# Patient Record
Sex: FEMALE | Race: OTHER | HISPANIC OR LATINO | URBAN - METROPOLITAN AREA
[De-identification: names, ages, dates, MRNs, and addresses within clinical notes are randomized per-mention and may not be internally consistent; named-entity substitution may affect disease eponyms.]

---
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
## 2022-04-11 PROBLEM — Z11.59 SCREENING FOR VIRAL DISEASE: Status: ACTIVE | Noted: 2021-01-22

## 2022-10-03 ENCOUNTER — APPOINTMENT (OUTPATIENT)
Dept: FAMILY MEDICINE | Facility: CLINIC | Age: 22
End: 2022-10-03

## 2022-10-03 VITALS
OXYGEN SATURATION: 98 % | WEIGHT: 146 LBS | HEIGHT: 61 IN | TEMPERATURE: 97.5 F | DIASTOLIC BLOOD PRESSURE: 64 MMHG | SYSTOLIC BLOOD PRESSURE: 108 MMHG | HEART RATE: 83 BPM | BODY MASS INDEX: 27.56 KG/M2

## 2022-10-03 DIAGNOSIS — Z13.29 ENCOUNTER FOR SCREENING FOR OTHER SUSPECTED ENDOCRINE DISORDER: ICD-10-CM

## 2022-10-03 DIAGNOSIS — R70.0 ELEVATED ERYTHROCYTE SEDIMENTATION RATE: ICD-10-CM

## 2022-10-03 DIAGNOSIS — R21 RASH AND OTHER NONSPECIFIC SKIN ERUPTION: ICD-10-CM

## 2022-10-03 DIAGNOSIS — Z87.2 PERSONAL HISTORY OF DISEASES OF THE SKIN AND SUBCUTANEOUS TISSUE: ICD-10-CM

## 2022-10-03 DIAGNOSIS — L03.115 CELLULITIS OF RIGHT LOWER LIMB: ICD-10-CM

## 2022-10-03 DIAGNOSIS — Z13.220 ENCOUNTER FOR SCREENING FOR LIPOID DISORDERS: ICD-10-CM

## 2022-10-03 DIAGNOSIS — Z13.1 ENCOUNTER FOR SCREENING FOR DIABETES MELLITUS: ICD-10-CM

## 2022-10-03 DIAGNOSIS — R79.82 ELEVATED C-REACTIVE PROTEIN (CRP): ICD-10-CM

## 2022-10-03 DIAGNOSIS — D50.8 OTHER IRON DEFICIENCY ANEMIAS: ICD-10-CM

## 2022-10-03 DIAGNOSIS — Z00.00 ENCOUNTER FOR GENERAL ADULT MEDICAL EXAMINATION W/OUT ABNORMAL FINDINGS: ICD-10-CM

## 2022-10-03 DIAGNOSIS — R76.8 OTHER SPECIFIED ABNORMAL IMMUNOLOGICAL FINDINGS IN SERUM: ICD-10-CM

## 2022-10-03 DIAGNOSIS — Z86.2 PERSONAL HISTORY OF DISEASES OF THE BLOOD AND BLOOD-FORMING ORGANS AND CERTAIN DISORDERS INVOLVING THE IMMUNE MECHANISM: ICD-10-CM

## 2022-10-03 DIAGNOSIS — H54.7 UNSPECIFIED VISUAL LOSS: ICD-10-CM

## 2022-10-03 DIAGNOSIS — Z86.39 PERSONAL HISTORY OF OTHER ENDOCRINE, NUTRITIONAL AND METABOLIC DISEASE: ICD-10-CM

## 2022-10-03 PROCEDURE — 99395 PREV VISIT EST AGE 18-39: CPT | Mod: 25

## 2022-10-03 PROCEDURE — G0444 DEPRESSION SCREEN ANNUAL: CPT | Mod: 59

## 2022-10-03 PROCEDURE — 36415 COLL VENOUS BLD VENIPUNCTURE: CPT

## 2022-10-03 RX ORDER — CEFADROXIL 500 MG/1
500 CAPSULE ORAL TWICE DAILY
Qty: 14 | Refills: 0 | Status: DISCONTINUED | COMMUNITY
Start: 2021-10-28 | End: 2022-10-03

## 2022-10-03 RX ORDER — ACETAMINOPHEN 500 MG/1
500 TABLET ORAL
Qty: 180 | Refills: 0 | Status: DISCONTINUED | COMMUNITY
Start: 2021-10-22 | End: 2022-10-03

## 2022-10-03 RX ORDER — ERGOCALCIFEROL 1.25 MG/1
1.25 MG CAPSULE, LIQUID FILLED ORAL
Qty: 12 | Refills: 0 | Status: DISCONTINUED | COMMUNITY
Start: 2021-02-01 | End: 2022-10-03

## 2022-10-03 RX ORDER — NAPROXEN 375 MG/1
375 TABLET ORAL
Qty: 20 | Refills: 0 | Status: DISCONTINUED | COMMUNITY
Start: 2021-10-28 | End: 2022-10-03

## 2022-10-03 NOTE — HISTORY OF PRESENT ILLNESS
[FreeTextEntry1] : CPE. [de-identified] : 23 yo female presents for annual physical. No acute complaints. Reports feeling well. Denies fever, chills, cp, palpitations, sob, nv, heat/cold intolerance, dizziness, melena, hematochezia, muscle weakness, loss of sensation, bowel/bladder incontinence or calf pain.\par

## 2022-10-03 NOTE — ASSESSMENT
[FreeTextEntry1] : Annual physical: f/u routine labwork \par Positive EMMA: no joint concerns, will ct monitor, f/u rheum\par Elevated ESR/CRP: likely reactive, f/u repeat\par Vitamin D def: f/u level\par BMI 27: Recommend low fat diet, wt loss, exercise, nutritional counseling provided, f/u lipid panel\par RTC 3 wks

## 2022-10-03 NOTE — HEALTH RISK ASSESSMENT
[Very Good] : ~his/her~  mood as very good [Never] : Never [Yes] : Yes [2 - 4 times a month (2 pts)] : 2-4 times a month (2 points) [1 or 2 (0 pts)] : 1 or 2 (0 points) [Never (0 pts)] : Never (0 points) [No] : In the past 12 months have you used drugs other than those required for medical reasons? No [No falls in past year] : Patient reported no falls in the past year [0] : 1) Little interest or pleasure doing things: Not at all (0) [1] : 2) Feeling down, depressed, or hopeless for several days (1) [PHQ-2 Negative - No further assessment needed] : PHQ-2 Negative - No further assessment needed [Audit-CScore] : 2 [de-identified] : regularly [de-identified] : healthy [[id number]] : 1 [HIV test declined] : HIV test declined [Hepatitis C test declined] : Hepatitis C test declined [With Family] : lives with family [Employed] : employed [Student] : student [Single] : single [Sexually Active] : not sexually active [High Risk Behavior] : no high risk behavior [Feels Safe at Home] : Feels safe at home [Fully functional (bathing, dressing, toileting, transferring, walking, feeding)] : Fully functional (bathing, dressing, toileting, transferring, walking, feeding) [Fully functional (using the telephone, shopping, preparing meals, housekeeping, doing laundry, using] : Fully functional and needs no help or supervision to perform IADLs (using the telephone, shopping, preparing meals, housekeeping, doing laundry, using transportation, managing medications and managing finances) [Reports changes in hearing] : Reports no changes in hearing [Reports changes in vision] : Reports no changes in vision [Reports changes in dental health] : Reports no changes in dental health

## 2022-10-11 LAB
25(OH)D3 SERPL-MCNC: 16.6 NG/ML
ALBUMIN SERPL ELPH-MCNC: 4.7 G/DL
ALP BLD-CCNC: 87 U/L
ALT SERPL-CCNC: 10 U/L
ANION GAP SERPL CALC-SCNC: 12 MMOL/L
APPEARANCE: CLEAR
AST SERPL-CCNC: 19 U/L
BACTERIA: NEGATIVE
BASOPHILS # BLD AUTO: 0.05 K/UL
BASOPHILS NFR BLD AUTO: 0.6 %
BILIRUB SERPL-MCNC: 0.6 MG/DL
BILIRUBIN URINE: NEGATIVE
BLOOD URINE: NEGATIVE
BUN SERPL-MCNC: 11 MG/DL
CALCIUM SERPL-MCNC: 9.6 MG/DL
CHLORIDE SERPL-SCNC: 103 MMOL/L
CHOLEST SERPL-MCNC: 143 MG/DL
CO2 SERPL-SCNC: 23 MMOL/L
COLOR: YELLOW
CREAT SERPL-MCNC: 0.54 MG/DL
CRP SERPL-MCNC: <3 MG/L
EGFR: 135 ML/MIN/1.73M2
EOSINOPHIL # BLD AUTO: 0.11 K/UL
EOSINOPHIL NFR BLD AUTO: 1.4 %
ERYTHROCYTE [SEDIMENTATION RATE] IN BLOOD BY WESTERGREN METHOD: 65 MM/HR
ESTIMATED AVERAGE GLUCOSE: 108 MG/DL
GLUCOSE QUALITATIVE U: NEGATIVE
GLUCOSE SERPL-MCNC: 78 MG/DL
HBA1C MFR BLD HPLC: 5.4 %
HCT VFR BLD CALC: 37.3 %
HDLC SERPL-MCNC: 58 MG/DL
HGB BLD-MCNC: 11.8 G/DL
HYALINE CASTS: 0 /LPF
IMM GRANULOCYTES NFR BLD AUTO: 0.3 %
KETONES URINE: NEGATIVE
LDLC SERPL CALC-MCNC: 75 MG/DL
LEUKOCYTE ESTERASE URINE: NEGATIVE
LYMPHOCYTES # BLD AUTO: 1.81 K/UL
LYMPHOCYTES NFR BLD AUTO: 23.1 %
MAN DIFF?: NORMAL
MCHC RBC-ENTMCNC: 26 PG
MCHC RBC-ENTMCNC: 31.6 GM/DL
MCV RBC AUTO: 82.2 FL
MICROSCOPIC-UA: NORMAL
MONOCYTES # BLD AUTO: 0.78 K/UL
MONOCYTES NFR BLD AUTO: 9.9 %
NEUTROPHILS # BLD AUTO: 5.08 K/UL
NEUTROPHILS NFR BLD AUTO: 64.7 %
NITRITE URINE: NEGATIVE
NONHDLC SERPL-MCNC: 84 MG/DL
PH URINE: 5.5
PLATELET # BLD AUTO: 231 K/UL
POTASSIUM SERPL-SCNC: 4.5 MMOL/L
PROT SERPL-MCNC: 7.5 G/DL
PROTEIN URINE: NORMAL
RBC # BLD: 4.54 M/UL
RBC # FLD: 15.2 %
RED BLOOD CELLS URINE: 4 /HPF
SODIUM SERPL-SCNC: 138 MMOL/L
SPECIFIC GRAVITY URINE: 1.03
SQUAMOUS EPITHELIAL CELLS: 1 /HPF
TRIGL SERPL-MCNC: 47 MG/DL
TSH SERPL-ACNC: 1.29 UIU/ML
UROBILINOGEN URINE: NORMAL
WBC # FLD AUTO: 7.85 K/UL
WHITE BLOOD CELLS URINE: 1 /HPF

## 2023-05-18 ENCOUNTER — APPOINTMENT (OUTPATIENT)
Dept: FAMILY MEDICINE | Facility: CLINIC | Age: 23
End: 2023-05-18
Payer: MEDICAID

## 2023-05-18 VITALS
HEIGHT: 61 IN | DIASTOLIC BLOOD PRESSURE: 70 MMHG | OXYGEN SATURATION: 100 % | HEART RATE: 79 BPM | SYSTOLIC BLOOD PRESSURE: 110 MMHG | BODY MASS INDEX: 32.1 KG/M2 | TEMPERATURE: 97.1 F | WEIGHT: 170 LBS

## 2023-05-18 DIAGNOSIS — E55.9 VITAMIN D DEFICIENCY, UNSPECIFIED: ICD-10-CM

## 2023-05-18 DIAGNOSIS — Z91.09 OTHER ALLERGY STATUS, OTHER THAN TO DRUGS AND BIOLOGICAL SUBSTANCES: ICD-10-CM

## 2023-05-18 DIAGNOSIS — F90.9 ATTENTION-DEFICIT HYPERACTIVITY DISORDER, UNSPECIFIED TYPE: ICD-10-CM

## 2023-05-18 PROCEDURE — 99214 OFFICE O/P EST MOD 30 MIN: CPT | Mod: 25

## 2023-05-18 RX ORDER — ERGOCALCIFEROL 1.25 MG/1
1.25 MG CAPSULE, LIQUID FILLED ORAL
Qty: 4 | Refills: 2 | Status: DISCONTINUED | COMMUNITY
Start: 2022-10-11 | End: 2023-05-18

## 2023-05-18 NOTE — HISTORY OF PRESENT ILLNESS
[FreeTextEntry1] : Pt. is here to discuss ADHD.  [de-identified] : 24 yo female presents with concern for difficulty concentrating, she says she has symptoms since childhood, she is interested in medication for adhd. Denies fever, chills, cp, palpitations, sob, nvcd.\par

## 2023-05-18 NOTE — ASSESSMENT
[FreeTextEntry1] : ADHD: Recommend maintaining daily schedule, limiting distractions, use charts/checklists, start vyvanse 30 mg po daily prn\par Allergies: well controlled\par Vitamin D def: f/u level\par RTC 2 wks

## 2023-05-25 LAB — 25(OH)D3 SERPL-MCNC: 16.5 NG/ML

## 2023-07-15 ENCOUNTER — APPOINTMENT (OUTPATIENT)
Dept: OBGYN | Facility: CLINIC | Age: 23
End: 2023-07-15
Payer: MEDICAID

## 2023-07-15 ENCOUNTER — NON-APPOINTMENT (OUTPATIENT)
Age: 23
End: 2023-07-15

## 2023-07-15 VITALS
WEIGHT: 172 LBS | DIASTOLIC BLOOD PRESSURE: 70 MMHG | HEIGHT: 61 IN | BODY MASS INDEX: 32.47 KG/M2 | SYSTOLIC BLOOD PRESSURE: 118 MMHG

## 2023-07-15 DIAGNOSIS — N89.8 OTHER SPECIFIED NONINFLAMMATORY DISORDERS OF VAGINA: ICD-10-CM

## 2023-07-15 PROCEDURE — 99203 OFFICE O/P NEW LOW 30 MIN: CPT

## 2023-07-15 NOTE — PLAN
[FreeTextEntry1] : vaginal discharge\par - normal exam today- d/c appeared normal\par - discussed pH balance questions pt had\par - gc/ct and affirm collected- will treat based on results\par \par follow up in 1-2 mos for annual exam

## 2023-07-15 NOTE — PHYSICAL EXAM
[Chaperone Present] : A chaperone was present in the examining room during all aspects of the physical examination [FreeTextEntry1] : SHANNON Gomes [Appropriately responsive] : appropriately responsive [Alert] : alert [No Acute Distress] : no acute distress [Oriented x3] : oriented x3 [Labia Majora] : normal [Labia Minora] : normal [Discharge] : a  ~M vaginal discharge was present [Scant] : scant [White] : white [No Bleeding] : There was no active vaginal bleeding [Normal] : normal [Uterine Adnexae] : normal

## 2023-07-15 NOTE — HISTORY OF PRESENT ILLNESS
[Y] : Patient is sexually active [N] : Patient denies prior pregnancies [Menarche Age: ____] : age at menarche was [unfilled] [No] : Patient does not have concerns regarding sex [Currently Active] : currently active [Women] : women [TextBox_4] : Lucy is here for c/o vaginal discharge and odor for the past few weeks.\par \par she denies pelvic pain, changes in her menses. \par \par She is sexually active with one female partner for over 1 year.   [LMPDate] : 09/19/23 [PGHxTotal] : 0 [FreeTextEntry1] : 06/19/23 [FreeTextEntry2] : has been sexually active with men in the past but not recently.

## 2023-07-17 LAB
C TRACH RRNA SPEC QL NAA+PROBE: NOT DETECTED
CANDIDA VAG CYTO: NOT DETECTED
G VAGINALIS+PREV SP MTYP VAG QL MICRO: DETECTED
N GONORRHOEA RRNA SPEC QL NAA+PROBE: NOT DETECTED
SOURCE AMPLIFICATION: NORMAL
T VAGINALIS VAG QL WET PREP: NOT DETECTED

## 2023-07-19 DIAGNOSIS — N76.0 ACUTE VAGINITIS: ICD-10-CM

## 2023-07-19 DIAGNOSIS — B96.89 ACUTE VAGINITIS: ICD-10-CM

## 2023-07-25 ENCOUNTER — APPOINTMENT (OUTPATIENT)
Dept: FAMILY MEDICINE | Facility: CLINIC | Age: 23
End: 2023-07-25

## 2024-02-20 ENCOUNTER — EMERGENCY (EMERGENCY)
Facility: HOSPITAL | Age: 24
LOS: 1 days | Discharge: HOME | End: 2024-02-20
Attending: EMERGENCY MEDICINE
Payer: COMMERCIAL

## 2024-02-20 VITALS
DIASTOLIC BLOOD PRESSURE: 76 MMHG | SYSTOLIC BLOOD PRESSURE: 119 MMHG | WEIGHT: 190.92 LBS | HEART RATE: 87 BPM | OXYGEN SATURATION: 99 % | HEIGHT: 62 IN | RESPIRATION RATE: 18 BRPM | TEMPERATURE: 98 F

## 2024-02-20 PROCEDURE — 99283 EMERGENCY DEPT VISIT LOW MDM: CPT

## 2024-02-20 PROCEDURE — 99282 EMERGENCY DEPT VISIT SF MDM: CPT

## 2024-02-20 NOTE — ED PROVIDER NOTE - CLINICAL SUMMARY MEDICAL DECISION MAKING FREE TEXT BOX
Patient with no significant past medical history presents emergency department for evaluation of head injury.  Patient states that she slipped down the stairs last night striking her head on a concrete step.  Patient states that she remembers before during and after the fall states that she has a mild mild nonradiating pain in the back of her head where she landed patient denies LOC nausea vomiting weakness numbness change in vision change in hearing.  On exam patient with no acute findings no hematoma head normocephalic no step-offs neurovascularly intact.  Patient greater than 12 hours status post fall neurovasc intact no acute findings on physical exam, patient to [place] with supportive care concussion precautions follow-up to PCP.  Patient educated about when to return to the ED if needed. PT verbalizes that he understands all instructions and results. Pt infomred that ED is open and availible 24/7 365 days a yr, encouraged to return to the ED if they have any change in condition, or feel the need for revaluation.

## 2024-02-20 NOTE — ED PROVIDER NOTE - NSFOLLOWUPINSTRUCTIONS_ED_ALL_ED_FT
Please Uses Over the Counter:   1) Tylenol: 500-650mg every 6hours as needed   2) Ibuprofen/Motrin/Advil: 400-600mg every 6hours as needed  FOR PAIN.       Patient education: Concussion in adults (The Basics)  Written by the doctors and editors at [facility]  Please read the Disclaimer at the end of this page.    What is a concussion?  A "concussion" is the medical term for a mild brain injury. It can cause confusion, memory loss, and headache.    A concussion usually happens after a person hits their head. But in some cases, it can happen after an injury or accident that causes violent shaking of the head.    Common causes of concussion include:    ?Car accidents    ?Falling down and other accidents that can happen from daily activities    ?Injuries from playing sports such as football, ice hockey, soccer, and boxing    ?Injuries that can happen to soldiers during combat. These include injuries from blasts and bullet wounds.    What are the symptoms of a concussion?  People used to think that "passing out" or "blacking out" was an important feature of a concussion. But it is actually common to have a concussion without blacking out.    Symptoms that can happen immediately, or in the first minutes to hours after a concussion, include:    ?Memory loss – People sometimes forget what caused their injury, as well as what happened right before and after the injury.    ?Confusion    ?Headache    ?Dizziness or trouble with balance    ?Nausea or vomiting    Some people recover quickly from a concussion and have no further symptoms. But others have symptoms that persist or happen hours to days after a concussion. These might include:    ?Trouble walking or talking    ?Memory problems or problems paying attention    ?Trouble sleeping or feeling very tired or sleepy    ?Mood or behavior changes (for example, acting cranky, irritable, or not like themselves)    ?Being bothered by things like noise or light    Will I need tests?  It depends on your injury and symptoms. To check if you have a concussion, your doctor will ask about your symptoms and do a physical exam. They will also ask you questions to check that you are thinking clearly.    If your doctor suspects a serious injury, they might order an imaging test of the brain, such as a CT or MRI scan. These tests create pictures of the skull and inside of the brain.    How is a concussion treated?  If you think that you are having symptoms related to a head injury or concussion, see a doctor or nurse. This might be your regular doctor, or they might refer you to a different doctor.    Treatment of a concussion involves:    ?Preventing further injury – While you are healing, it's important not to do too much, especially activities that could lead to another head injury, like organized sports. Having a second injury while the brain is healing from a concussion can seriously damage the brain.    ?Physical rest – Rest your body, and get plenty of sleep. Avoid heavy exercise or too much physical activity if it makes you feel worse.    ?Mental rest – Doctors also call this "cognitive rest." Avoid doing activities that need concentration or a lot of attention if they make you feel worse. Sometimes, activities using screens, especially videogames, can make people feel worse after a concussion. You can start doing these things again as you get better.    ?Avoiding alcohol and cannabis while you are still having symptoms of concussion    ?Treating headache – You can take an over-the-counter pain reliever if you have a headache. These include acetaminophen (sample brand name: Tylenol) and NSAIDs such as ibuprofen (sample brand names: Advil, Motrin) and naproxen (sample brand name: Aleve).    Most concussions get better on their own, but it can take time. For some people, the symptoms go away within minutes to hours. Other people have symptoms for weeks to months. When symptoms last a long time, doctors call it "postconcussion syndrome."    When should I call for help?  After a concussion, your doctor might recommend that someone stay with you for 12 to 24 hours. This person should watch for any new symptoms.    Someone should call for an ambulance (in the [place] and [place], call 9-1-1) if you:    ?Cannot be fully woken up    ?Are acting confused or disoriented    ?Have a sudden and persistent change in your behavior    ?Cannot walk normally    ?Have trouble speaking or slurred speech    ?Have severe weakness or cannot move an arm, leg, or 1 side of your face    ?Have a seizure or jerking of your arms or legs you cannot control    Someone should call the doctor or nurse for advice if you:    ?Have concussion symptoms that are not improving or are getting worse, even with physical and mental rest    ?Have blood or clear liquid draining from your ears or nose    ?Seem weak or have numbness in an arm, leg, or other body part    ?Have a stiff neck    ?Have a headache that is severe, gets worse, feels different, or does not get better with over-the-counter medicines    If any of the above symptoms seem severe, or if you are concerned but cannot reach the doctor or nurse, seek emergency help. These things don't always mean that there is a serious problem, but seeing a doctor or nurse is the only way to know for sure.    When can I play sports or do my usual activities again?  Ask your doctor when you can play sports or do your usual activities again. It will depend on your injury and symptoms, as well as the type of sport you play. Do not play sports on the same day as your injury.    When you are able to return to work also depends on your symptoms and the type of work you do.    It's important to let your brain heal completely after a concussion. Getting another concussion before your brain has healed can lead to serious brain problems.    How can I prevent another concussion?  To help prevent another concussion, you can:    ?Wear a helmet when you ride a bike or motorcycle, or play certain sports.    ?Wear a seat belt when you drive or ride in a car.    If you have had a concussion, it's very important to try to prevent future concussions. Having many concussions might cause long-term brain damage and affect your thinking.

## 2024-02-20 NOTE — ED PROVIDER NOTE - OBJECTIVE STATEMENT
Patient with no significant past medical history presents emergency department for evaluation of head injury.  Patient states that she slipped down the stairs last night striking her head on a concrete step.  Patient states that she remembers before during and after the fall states that she has a mild mild nonradiating pain in the back of her head where she landed patient denies LOC nausea vomiting weakness numbness change in vision change in hearing.

## 2024-02-20 NOTE — ED ADULT TRIAGE NOTE - TEMPERATURE IN FAHRENHEIT (DEGREES F)
Patient informed of the negative COVID-19 test result and instructed to social distance, wear a mask, and wash hands in order to prevent getting sick with COVID-19.     
Regarding: [place] Would like message from office  ----- Message from Elyce Reyes, HUC sent at 10/7/2021  6:29 PM CDT -----  Patient Name: Chrystal Liu    Full Name of Provider seen for current symptoms: Dr. Maria Del Carmen Mart     Pregnant (If Yes, how long?): N/A    Symptoms: Would like message from office    Do you or any of your household members have the following symptoms:  Fever >100.0#F or >38.0#C: N/a    New or worsening cough, shortness of breath, sore throat, congestion, or runny nose: N/a    New onset of nausea, vomiting or diarrhea: N/a    New onset of loss of taste or smell, chills, repeated shaking with chills, muscle pain, or headache: N/a    Have you, a household member, or another person you have been in contact with tested positive for COVID-19 in the last 14 days?: N/a    Call Back #: [phone]    Call Center Account # for provider seen for current symptoms: [id number]    Which State are you currently located in? (enter State name in Summary field): [place]   
97.8

## 2024-02-20 NOTE — ED PROVIDER NOTE - ENMT, MLM
Airway patent, Nasal mucosa clear. Mouth with normal mucosa. Throat has no vesicles, no oropharyngeal exudates and uvula is midline.  no acute findings no hematoma head normocephalic no step-offs neurovascularly intact.

## 2024-02-20 NOTE — ED PROVIDER NOTE - ADDITIONAL NOTES AND INSTRUCTIONS:
PT was evaluated At [facility] ED and was found to have a condition that warranted time of to rest and heal from WORK/SCHOOL.   Roderick Dean PA-C

## 2024-02-20 NOTE — ED PROVIDER NOTE - PATIENT PORTAL LINK FT
You can access the FollowMyHealth Patient Portal offered by [facility] by registering at the following website: [email or web address]. By joining [employer]’s FollowMyHealth portal, you will also be able to view your health information using other applications (apps) compatible with our system.

## 2024-03-23 ENCOUNTER — APPOINTMENT (OUTPATIENT)
Dept: OBGYN | Facility: CLINIC | Age: 24
End: 2024-03-23
Payer: COMMERCIAL

## 2024-03-23 VITALS
DIASTOLIC BLOOD PRESSURE: 68 MMHG | WEIGHT: 190.13 LBS | HEIGHT: 61 IN | SYSTOLIC BLOOD PRESSURE: 104 MMHG | BODY MASS INDEX: 35.9 KG/M2

## 2024-03-23 DIAGNOSIS — R30.0 DYSURIA: ICD-10-CM

## 2024-03-23 LAB
APPEARANCE: CLEAR
BILIRUBIN URINE: NEGATIVE
BLOOD URINE: ABNORMAL
COLOR: YELLOW
GLUCOSE QUALITATIVE U: NEGATIVE
HCG UR QL: NEGATIVE
KETONES URINE: 160
LEUKOCYTE ESTERASE URINE: NEGATIVE
NITRITE URINE: NEGATIVE
PH URINE: 7
PROTEIN URINE: NEGATIVE
QUALITY CONTROL: YES
SPECIFIC GRAVITY URINE: 1.02
UROBILINOGEN URINE: 0.2 (ref 0.2–?)

## 2024-03-23 PROCEDURE — 99213 OFFICE O/P EST LOW 20 MIN: CPT | Mod: 25

## 2024-03-23 PROCEDURE — 81025 URINE PREGNANCY TEST: CPT

## 2024-03-23 NOTE — HISTORY OF PRESENT ILLNESS
[Menarche Age: ____] : age at menarche was [unfilled] [N] : Patient reports normal menses [Y] : Patient is sexually active [Currently Active] : currently active [No] : Patient does not have concerns regarding sex [Women] : women [GonorrheaDate] : 07/15/2023 [TextBox_63] : NEG [ChlamydiaDate] : 07/15/2023 [TextBox_68] : NEG [LMPDate] : 03/02/2024 [PGHxTotal] : 0 [FreeTextEntry1] : 03/02/2024

## 2024-03-23 NOTE — REASON FOR VISIT
[Follow-Up] : a follow-up evaluation of [FreeTextEntry2] : ODOR, STING/ BURNING SENSATION -2 TO 3 WKS AGO

## 2024-03-23 NOTE — PLAN
[FreeTextEntry1] : Pt declined physical exam.   Urinalysis showed ketones/ trace urine. Negative for bacteria.  Will send for culture.  Gonorrhea/ chlamydia from urine.  Will follow up with patient on results.   All questions and concerns addressed.

## 2024-03-27 LAB
BACTERIA UR CULT: NORMAL
C TRACH RRNA SPEC QL NAA+PROBE: NOT DETECTED
N GONORRHOEA RRNA SPEC QL NAA+PROBE: NOT DETECTED
SOURCE AMPLIFICATION: NORMAL

## 2024-06-01 ENCOUNTER — APPOINTMENT (OUTPATIENT)
Dept: OBGYN | Facility: CLINIC | Age: 24
End: 2024-06-01
Payer: COMMERCIAL

## 2024-06-01 VITALS
HEIGHT: 61 IN | SYSTOLIC BLOOD PRESSURE: 116 MMHG | BODY MASS INDEX: 35.87 KG/M2 | WEIGHT: 190 LBS | DIASTOLIC BLOOD PRESSURE: 74 MMHG

## 2024-06-01 LAB
HCG UR QL: NEGATIVE
QUALITY CONTROL: YES

## 2024-06-01 PROCEDURE — 81025 URINE PREGNANCY TEST: CPT

## 2024-06-01 PROCEDURE — 99213 OFFICE O/P EST LOW 20 MIN: CPT | Mod: 25

## 2024-06-01 RX ORDER — METRONIDAZOLE 7.5 MG/G
0.75 GEL VAGINAL
Qty: 1 | Refills: 0 | Status: DISCONTINUED | COMMUNITY
Start: 2023-07-19 | End: 2024-06-01

## 2024-06-01 RX ORDER — LISDEXAMFETAMINE DIMESYLATE 30 MG/1
30 CAPSULE ORAL
Qty: 30 | Refills: 0 | Status: DISCONTINUED | COMMUNITY
Start: 2023-05-18 | End: 2024-06-01

## 2024-06-01 RX ORDER — ERGOCALCIFEROL 1.25 MG/1
1.25 MG CAPSULE, LIQUID FILLED ORAL
Qty: 4 | Refills: 2 | Status: DISCONTINUED | COMMUNITY
Start: 2023-05-25 | End: 2024-06-01

## 2024-06-01 NOTE — HISTORY OF PRESENT ILLNESS
[Y] : Patient is sexually active [N] : Patient denies prior pregnancies [Menarche Age: ____] : age at menarche was [unfilled] [No] : Patient does not have concerns regarding sex [Currently Active] : currently active [Men] : men [LMPDate] : 04/15/24 [PGHxTotal] : 0 [FreeTextEntry1] : 04/15/24

## 2024-06-12 ENCOUNTER — APPOINTMENT (OUTPATIENT)
Dept: ANTEPARTUM | Facility: CLINIC | Age: 24
End: 2024-06-12
Payer: COMMERCIAL

## 2024-06-12 ENCOUNTER — ASOB RESULT (OUTPATIENT)
Age: 24
End: 2024-06-12

## 2024-06-12 PROCEDURE — 76856 US EXAM PELVIC COMPLETE: CPT | Mod: 59

## 2024-06-12 PROCEDURE — 76830 TRANSVAGINAL US NON-OB: CPT

## 2024-06-15 ENCOUNTER — APPOINTMENT (OUTPATIENT)
Dept: OBGYN | Facility: CLINIC | Age: 24
End: 2024-06-15
Payer: COMMERCIAL

## 2024-06-15 VITALS
SYSTOLIC BLOOD PRESSURE: 122 MMHG | BODY MASS INDEX: 35.87 KG/M2 | HEIGHT: 61 IN | DIASTOLIC BLOOD PRESSURE: 60 MMHG | WEIGHT: 190 LBS

## 2024-06-15 DIAGNOSIS — E28.2 POLYCYSTIC OVARIAN SYNDROME: ICD-10-CM

## 2024-06-15 DIAGNOSIS — N92.6 IRREGULAR MENSTRUATION, UNSPECIFIED: ICD-10-CM

## 2024-06-15 LAB
ANDROST SERPL-MCNC: 110 NG/DL
DHEA-S SERPL-MCNC: 321 UG/DL
ESTRADIOL SERPL-MCNC: 119 PG/ML
FSH SERPL-MCNC: 5.1 IU/L
HCG SERPL-MCNC: <1 MIU/ML
INSULIN P FAST SERPL-ACNC: 20.2 UU/ML
LH SERPL-ACNC: 13.2 IU/L
PROLACTIN SERPL-MCNC: 12.4 NG/ML
TESTOST FREE SERPL-MCNC: 2 PG/ML
TESTOST SERPL-MCNC: 38.3 NG/DL
TSH SERPL-ACNC: 2.56 UIU/ML

## 2024-06-15 PROCEDURE — 99213 OFFICE O/P EST LOW 20 MIN: CPT

## 2024-06-15 RX ORDER — METFORMIN ER 500 MG 500 MG/1
500 TABLET ORAL DAILY
Qty: 30 | Refills: 0 | Status: ACTIVE | COMMUNITY
Start: 2024-06-15 | End: 1900-01-01

## 2024-06-15 NOTE — HISTORY OF PRESENT ILLNESS
[N] : Patient denies prior pregnancies [Menarche Age: ____] : age at menarche was [unfilled] [Y] : Patient is sexually active [Currently Active] : currently active [GonorrheaDate] : 03/23/24 [TextBox_63] : NEG [ChlamydiaDate] : 03/23/24 [TextBox_68] : NEG [LMPDate] : 06/12/24 [PGHxTotal] : 0 [FreeTextEntry1] : 06/12/24

## 2024-06-19 ENCOUNTER — APPOINTMENT (OUTPATIENT)
Dept: OBGYN | Facility: CLINIC | Age: 24
End: 2024-06-19
Payer: COMMERCIAL

## 2024-06-19 PROCEDURE — 36415 COLL VENOUS BLD VENIPUNCTURE: CPT

## 2024-06-20 LAB — INSULIN P FAST SERPL-ACNC: 17.3 UU/ML

## 2024-07-20 ENCOUNTER — APPOINTMENT (OUTPATIENT)
Dept: OBGYN | Facility: CLINIC | Age: 24
End: 2024-07-20
Payer: COMMERCIAL

## 2024-07-20 DIAGNOSIS — E16.1 OTHER HYPOGLYCEMIA: ICD-10-CM

## 2024-07-20 PROCEDURE — 99213 OFFICE O/P EST LOW 20 MIN: CPT | Mod: 95

## 2024-07-20 RX ORDER — METFORMIN HYDROCHLORIDE 1000 MG/1
1000 TABLET, COATED ORAL DAILY
Qty: 90 | Refills: 0 | Status: ACTIVE | COMMUNITY
Start: 2024-07-20 | End: 1900-01-01

## 2024-07-27 ENCOUNTER — RX RENEWAL (OUTPATIENT)
Age: 24
End: 2024-07-27

## 2024-10-19 ENCOUNTER — APPOINTMENT (OUTPATIENT)
Dept: OBGYN | Facility: CLINIC | Age: 24
End: 2024-10-19
Payer: COMMERCIAL

## 2024-10-19 VITALS
WEIGHT: 185 LBS | DIASTOLIC BLOOD PRESSURE: 72 MMHG | HEIGHT: 61 IN | SYSTOLIC BLOOD PRESSURE: 114 MMHG | BODY MASS INDEX: 34.93 KG/M2

## 2024-10-19 DIAGNOSIS — E16.1 OTHER HYPOGLYCEMIA: ICD-10-CM

## 2024-10-19 DIAGNOSIS — E28.2 POLYCYSTIC OVARIAN SYNDROME: ICD-10-CM

## 2024-10-19 PROCEDURE — 99213 OFFICE O/P EST LOW 20 MIN: CPT

## 2025-03-03 ENCOUNTER — APPOINTMENT (OUTPATIENT)
Dept: OBGYN | Facility: CLINIC | Age: 25
End: 2025-03-03
Payer: COMMERCIAL

## 2025-03-03 VITALS
WEIGHT: 185 LBS | DIASTOLIC BLOOD PRESSURE: 76 MMHG | BODY MASS INDEX: 34.93 KG/M2 | SYSTOLIC BLOOD PRESSURE: 116 MMHG | HEIGHT: 61 IN

## 2025-03-03 DIAGNOSIS — Z12.4 ENCOUNTER FOR SCREENING FOR MALIGNANT NEOPLASM OF CERVIX: ICD-10-CM

## 2025-03-03 DIAGNOSIS — E16.1 OTHER HYPOGLYCEMIA: ICD-10-CM

## 2025-03-03 DIAGNOSIS — Z01.419 ENCOUNTER FOR GYNECOLOGICAL EXAMINATION (GENERAL) (ROUTINE) W/OUT ABNORMAL FINDINGS: ICD-10-CM

## 2025-03-03 DIAGNOSIS — Z12.39 ENCOUNTER FOR OTHER SCREENING FOR MALIGNANT NEOPLASM OF BREAST: ICD-10-CM

## 2025-03-03 DIAGNOSIS — E28.2 POLYCYSTIC OVARIAN SYNDROME: ICD-10-CM

## 2025-03-03 PROCEDURE — 99395 PREV VISIT EST AGE 18-39: CPT

## 2025-03-08 LAB
C TRACH RRNA SPEC QL NAA+PROBE: NOT DETECTED
CYTOLOGY CVX/VAG DOC THIN PREP: NORMAL
HCG UR QL: NEGATIVE
N GONORRHOEA RRNA SPEC QL NAA+PROBE: NOT DETECTED
QUALITY CONTROL: YES
SOURCE TP AMPLIFICATION: NORMAL

## 2025-03-10 RX ORDER — METFORMIN HYDROCHLORIDE 1000 MG/1
1000 TABLET, EXTENDED RELEASE ORAL DAILY
Qty: 90 | Refills: 0 | Status: ACTIVE | COMMUNITY
Start: 2025-03-10 | End: 1900-01-01

## 2025-04-14 ENCOUNTER — NON-APPOINTMENT (OUTPATIENT)
Age: 25
End: 2025-04-14

## 2025-04-15 ENCOUNTER — APPOINTMENT (OUTPATIENT)
Dept: FAMILY MEDICINE | Facility: CLINIC | Age: 25
End: 2025-04-15
Payer: COMMERCIAL

## 2025-04-15 VITALS
WEIGHT: 194 LBS | OXYGEN SATURATION: 99 % | BODY MASS INDEX: 36.63 KG/M2 | DIASTOLIC BLOOD PRESSURE: 70 MMHG | SYSTOLIC BLOOD PRESSURE: 108 MMHG | HEIGHT: 61 IN | HEART RATE: 98 BPM

## 2025-04-15 DIAGNOSIS — R79.89 OTHER SPECIFIED ABNORMAL FINDINGS OF BLOOD CHEMISTRY: ICD-10-CM

## 2025-04-15 DIAGNOSIS — F90.9 ATTENTION-DEFICIT HYPERACTIVITY DISORDER, UNSPECIFIED TYPE: ICD-10-CM

## 2025-04-15 DIAGNOSIS — E28.2 POLYCYSTIC OVARIAN SYNDROME: ICD-10-CM

## 2025-04-15 PROCEDURE — 99214 OFFICE O/P EST MOD 30 MIN: CPT

## 2025-04-16 LAB
T3 SERPL-MCNC: 146 NG/DL
T3FREE SERPL-MCNC: 3.31 PG/ML
T4 FREE SERPL-MCNC: 1.3 NG/DL
T4 SERPL-MCNC: 10.1 UG/DL
TSH SERPL-ACNC: 1.83 UIU/ML

## 2025-04-26 DIAGNOSIS — R63.5 ABNORMAL WEIGHT GAIN: ICD-10-CM

## 2025-05-15 ENCOUNTER — TRANSCRIPTION ENCOUNTER (OUTPATIENT)
Age: 25
End: 2025-05-15

## 2025-05-15 ENCOUNTER — APPOINTMENT (OUTPATIENT)
Dept: FAMILY MEDICINE | Facility: CLINIC | Age: 25
End: 2025-05-15
Payer: COMMERCIAL

## 2025-05-15 VITALS
SYSTOLIC BLOOD PRESSURE: 118 MMHG | TEMPERATURE: 98.2 F | OXYGEN SATURATION: 98 % | DIASTOLIC BLOOD PRESSURE: 74 MMHG | HEART RATE: 98 BPM | BODY MASS INDEX: 35.87 KG/M2 | HEIGHT: 61 IN | WEIGHT: 190 LBS

## 2025-05-15 DIAGNOSIS — R79.89 OTHER SPECIFIED ABNORMAL FINDINGS OF BLOOD CHEMISTRY: ICD-10-CM

## 2025-05-15 DIAGNOSIS — E28.2 POLYCYSTIC OVARIAN SYNDROME: ICD-10-CM

## 2025-05-15 DIAGNOSIS — F90.9 ATTENTION-DEFICIT HYPERACTIVITY DISORDER, UNSPECIFIED TYPE: ICD-10-CM

## 2025-05-15 DIAGNOSIS — R51.9 HEADACHE, UNSPECIFIED: ICD-10-CM

## 2025-05-15 DIAGNOSIS — R70.0 ELEVATED ERYTHROCYTE SEDIMENTATION RATE: ICD-10-CM

## 2025-05-15 DIAGNOSIS — G43.909 MIGRAINE, UNSPECIFIED, NOT INTRACTABLE, W/OUT STATUS MIGRAINOSUS: ICD-10-CM

## 2025-05-15 DIAGNOSIS — R79.82 ELEVATED C-REACTIVE PROTEIN (CRP): ICD-10-CM

## 2025-05-15 PROCEDURE — 99214 OFFICE O/P EST MOD 30 MIN: CPT

## 2025-05-15 RX ORDER — SUMATRIPTAN 50 MG/1
50 TABLET, FILM COATED ORAL
Qty: 30 | Refills: 1 | Status: ACTIVE | COMMUNITY
Start: 2025-05-15 | End: 1900-01-01

## 2025-05-16 LAB
ALBUMIN SERPL ELPH-MCNC: 4.2 G/DL
ALP BLD-CCNC: 100 U/L
ALT SERPL-CCNC: 12 U/L
ANION GAP SERPL CALC-SCNC: 14 MMOL/L
AST SERPL-CCNC: 18 U/L
BASOPHILS # BLD AUTO: 0.05 K/UL
BASOPHILS NFR BLD AUTO: 0.6 %
BILIRUB SERPL-MCNC: 0.4 MG/DL
BUN SERPL-MCNC: 9 MG/DL
CALCIUM SERPL-MCNC: 9.3 MG/DL
CHLORIDE SERPL-SCNC: 102 MMOL/L
CO2 SERPL-SCNC: 22 MMOL/L
CREAT SERPL-MCNC: 0.6 MG/DL
EGFRCR SERPLBLD CKD-EPI 2021: 129 ML/MIN/1.73M2
EOSINOPHIL # BLD AUTO: 0.14 K/UL
EOSINOPHIL NFR BLD AUTO: 1.8 %
ERYTHROCYTE [SEDIMENTATION RATE] IN BLOOD BY WESTERGREN METHOD: 59 MM/HR
GLUCOSE SERPL-MCNC: 91 MG/DL
HCT VFR BLD CALC: 39.3 %
HGB BLD-MCNC: 12.2 G/DL
IMM GRANULOCYTES NFR BLD AUTO: 0.3 %
LYMPHOCYTES # BLD AUTO: 1.92 K/UL
LYMPHOCYTES NFR BLD AUTO: 24.5 %
MAN DIFF?: NORMAL
MCHC RBC-ENTMCNC: 26.1 PG
MCHC RBC-ENTMCNC: 31 G/DL
MCV RBC AUTO: 84 FL
MONOCYTES # BLD AUTO: 0.58 K/UL
MONOCYTES NFR BLD AUTO: 7.4 %
NEUTROPHILS # BLD AUTO: 5.12 K/UL
NEUTROPHILS NFR BLD AUTO: 65.4 %
PLATELET # BLD AUTO: 268 K/UL
POTASSIUM SERPL-SCNC: 4.6 MMOL/L
PROT SERPL-MCNC: 7.3 G/DL
RBC # BLD: 4.68 M/UL
RBC # FLD: 14.7 %
SODIUM SERPL-SCNC: 138 MMOL/L
TSH SERPL-ACNC: 2.18 UIU/ML
WBC # FLD AUTO: 7.83 K/UL

## 2025-05-19 ENCOUNTER — APPOINTMENT (OUTPATIENT)
Dept: OBGYN | Facility: CLINIC | Age: 25
End: 2025-05-19

## 2025-05-20 ENCOUNTER — APPOINTMENT (OUTPATIENT)
Dept: FAMILY MEDICINE | Facility: CLINIC | Age: 25
End: 2025-05-20

## 2025-05-26 DIAGNOSIS — R70.0 ELEVATED ERYTHROCYTE SEDIMENTATION RATE: ICD-10-CM
